# Patient Record
Sex: FEMALE | Race: WHITE | NOT HISPANIC OR LATINO | Employment: UNEMPLOYED | URBAN - METROPOLITAN AREA
[De-identification: names, ages, dates, MRNs, and addresses within clinical notes are randomized per-mention and may not be internally consistent; named-entity substitution may affect disease eponyms.]

---

## 2021-12-01 ENCOUNTER — OFFICE VISIT (OUTPATIENT)
Dept: FAMILY MEDICINE CLINIC | Facility: CLINIC | Age: 36
End: 2021-12-01

## 2021-12-01 VITALS
HEIGHT: 60 IN | WEIGHT: 126 LBS | BODY MASS INDEX: 24.74 KG/M2 | SYSTOLIC BLOOD PRESSURE: 122 MMHG | DIASTOLIC BLOOD PRESSURE: 76 MMHG

## 2021-12-01 DIAGNOSIS — R11.0 NAUSEA: ICD-10-CM

## 2021-12-01 DIAGNOSIS — Z13.220 LIPID SCREENING: ICD-10-CM

## 2021-12-01 DIAGNOSIS — Z00.00 ANNUAL PHYSICAL EXAM: ICD-10-CM

## 2021-12-01 DIAGNOSIS — M25.561 PAIN IN BOTH KNEES, UNSPECIFIED CHRONICITY: ICD-10-CM

## 2021-12-01 DIAGNOSIS — Z23 NEED FOR PNEUMOCOCCAL VACCINE: ICD-10-CM

## 2021-12-01 DIAGNOSIS — Z23 INFLUENZA VACCINE NEEDED: ICD-10-CM

## 2021-12-01 DIAGNOSIS — F41.9 ANXIETY: ICD-10-CM

## 2021-12-01 DIAGNOSIS — A53.9 SYPHILIS: Primary | ICD-10-CM

## 2021-12-01 DIAGNOSIS — J45.40 MODERATE PERSISTENT ASTHMA WITHOUT COMPLICATION: ICD-10-CM

## 2021-12-01 DIAGNOSIS — Z11.59 NEED FOR HEPATITIS C SCREENING TEST: ICD-10-CM

## 2021-12-01 DIAGNOSIS — M25.562 PAIN IN BOTH KNEES, UNSPECIFIED CHRONICITY: ICD-10-CM

## 2021-12-01 PROCEDURE — 99385 PREV VISIT NEW AGE 18-39: CPT

## 2021-12-01 PROCEDURE — 90686 IIV4 VACC NO PRSV 0.5 ML IM: CPT

## 2021-12-01 PROCEDURE — 99204 OFFICE O/P NEW MOD 45 MIN: CPT

## 2021-12-01 PROCEDURE — 90471 IMMUNIZATION ADMIN: CPT

## 2021-12-01 RX ORDER — BUSPIRONE HYDROCHLORIDE 5 MG/1
5 TABLET ORAL 2 TIMES DAILY
Qty: 90 TABLET | Refills: 0 | Status: SHIPPED | OUTPATIENT
Start: 2021-12-01 | End: 2022-02-04 | Stop reason: SDUPTHER

## 2021-12-01 RX ORDER — FLUOXETINE HYDROCHLORIDE 40 MG/1
CAPSULE ORAL
COMMUNITY
Start: 2021-11-11 | End: 2022-01-03 | Stop reason: SDUPTHER

## 2021-12-01 RX ORDER — ONDANSETRON 4 MG/1
4 TABLET, FILM COATED ORAL EVERY 8 HOURS PRN
Qty: 10 TABLET | Refills: 0 | Status: SHIPPED | OUTPATIENT
Start: 2021-12-01

## 2021-12-01 RX ORDER — TRAZODONE HYDROCHLORIDE 50 MG/1
TABLET ORAL
COMMUNITY
Start: 2021-11-18

## 2021-12-01 RX ORDER — ACAMPROSATE CALCIUM 333 MG/1
TABLET, DELAYED RELEASE ORAL
COMMUNITY
Start: 2021-11-18

## 2021-12-01 RX ORDER — HYDROXYZINE PAMOATE 25 MG/1
CAPSULE ORAL
COMMUNITY
Start: 2021-11-11 | End: 2021-12-01

## 2021-12-01 RX ORDER — BUPRENORPHINE HYDROCHLORIDE AND NALOXONE HYDROCHLORIDE DIHYDRATE 8; 2 MG/1; MG/1
TABLET SUBLINGUAL
COMMUNITY
Start: 2021-11-18

## 2021-12-01 RX ORDER — ALBUTEROL SULFATE 90 UG/1
2 AEROSOL, METERED RESPIRATORY (INHALATION) EVERY 4 HOURS PRN
Qty: 18 G | Refills: 3 | Status: SHIPPED | OUTPATIENT
Start: 2021-12-01

## 2021-12-01 RX ORDER — MULTIVITAMIN
TABLET ORAL
COMMUNITY
Start: 2021-11-11

## 2021-12-01 RX ORDER — IBUPROFEN 800 MG/1
TABLET ORAL
COMMUNITY
Start: 2021-11-11 | End: 2021-12-01 | Stop reason: SDUPTHER

## 2021-12-01 RX ORDER — IBUPROFEN 800 MG/1
800 TABLET ORAL EVERY 8 HOURS PRN
Qty: 30 TABLET | Refills: 0 | Status: SHIPPED | OUTPATIENT
Start: 2021-12-01 | End: 2022-02-04 | Stop reason: SDUPTHER

## 2021-12-01 NOTE — PROGRESS NOTES
Subjective   Soniya Vickers is a 36 y.o. female. Presents today as a New patient here to establish care    Chief Complaint   Patient presents with   • Establish Care       History of Present Illness     Previous PCP: Has not seen anyone for a long time.  Recently been going to [facility] [address] -   Medical Hx: states was told + syphilis about 2 months ago- was referred to ID but never made appointment.     Syphilis:  Dx approx 2 months ago  Unsure where she got it- states last sexual partners where at beginning of year- had 2 different partners  Developed Rash on hand and feet at end august   Has also felt really tired recently and c.o bettie knee pain and back pain.   States feels like getting a cold  Denies prior hx  Denies concern for other STDs  States not sexually active      Drug/alcohol abuse  Was incarcerated x 2 months r/t drugs   Went to rehab for drug and alcohol addition right after getting out of care home and completed in June  Used meth & alcohol  Sober since June 23rd.  Using suboxone- goes q 2 weeks to clinic  Has been experiencing some nausea when takes suboxone    ADHD  Would like tested   Has always thought she has but never tested as a child  Has trouble focusing on anything  Cannot keep appointments  Cannot keep house clean has stuff everywhere  Cannot sit down and finish a task    Asthma  Hx for years  Does not think has ever been tested  On albuterol and Symbicort and advair in the past but has been out for a long time  Has been wheezing a lot recently- wheezing today  Wakes up gasping for breath most of day of week  No SOA, CP      Smoking 1/3 pack ciggaretes/ day x 15 years- was given nicotine patch and gum but has not been using as not ready to quit  Had 1 covid vaccine when in care home- 2nd dose due  Has not had flu and pneumonia vaccine  Pap due  Labs due    Review of Systems   Constitutional: Positive for fatigue. Negative for chills, diaphoresis, fever and unexpected weight change.  "  HENT: Negative.    Eyes: Negative for visual disturbance.   Respiratory: Positive for cough and wheezing. Negative for apnea, choking, chest tightness, shortness of breath and stridor.    Cardiovascular: Negative.    Gastrointestinal: Negative.    Endocrine: Negative.    Genitourinary: Negative.    Musculoskeletal: Positive for arthralgias and back pain. Negative for joint swelling and myalgias.   Skin: Positive for rash.   Hematological: Negative.    Psychiatric/Behavioral: Positive for decreased concentration and sleep disturbance. Negative for self-injury and suicidal ideas. The patient is nervous/anxious.        There is no problem list on file for this patient.    Past Medical History:   Diagnosis Date   • Anxiety    • Depression      Past Surgical History:   Procedure Laterality Date   •  SECTION       History reviewed. No pertinent family history.  Social History     Socioeconomic History   • Marital status:    Tobacco Use   • Smoking status: Current Every Day Smoker     Packs/day: 0.25     Types: Cigarettes   • Smokeless tobacco: Never Used   Vaping Use   • Vaping Use: Every day   • Substances: Nicotine   Substance and Sexual Activity   • Alcohol use: Not Currently   • Drug use: Not Currently   • Sexual activity: Defer       No Known Allergies    Current Outpatient Medications on File Prior to Visit   Medication Sig Dispense Refill   • acamprosate (CAMPRAL) 333 MG EC tablet      • buprenorphine-naloxone (SUBOXONE) 8-2 MG per SL tablet      • FLUoxetine (PROzac) 40 MG capsule      • Multivitamin tablet tablet      • traZODone (DESYREL) 50 MG tablet      • [DISCONTINUED] hydrOXYzine pamoate (VISTARIL) 25 MG capsule      • [DISCONTINUED] ibuprofen (ADVIL,MOTRIN) 800 MG tablet        No current facility-administered medications on file prior to visit.       Objective   Vitals:    21 1451   BP: 122/76   Weight: 57.2 kg (126 lb)   Height: 152.4 cm (60\")     Body mass index is 24.61 " kg/m².  Physical Exam  HENT:      Head: Normocephalic.      Mouth/Throat:      Mouth: Mucous membranes are moist. No injury or oral lesions.      Dentition: Abnormal dentition. No dental tenderness.      Comments: edentulous  Eyes:      Conjunctiva/sclera: Conjunctivae normal.   Neck:      Thyroid: No thyroid mass, thyromegaly or thyroid tenderness.      Vascular: No carotid bruit.   Cardiovascular:      Rate and Rhythm: Normal rate and regular rhythm.      Pulses: Normal pulses.           Carotid pulses are 2+ on the right side and 2+ on the left side.     Heart sounds: No murmur heard.      Pulmonary:      Effort: Pulmonary effort is normal. No tachypnea or respiratory distress.      Breath sounds: No decreased air movement. Examination of the right-upper field reveals wheezing. Examination of the left-upper field reveals wheezing. Examination of the right-middle field reveals wheezing. Examination of the left-middle field reveals wheezing. Examination of the right-lower field reveals wheezing. Examination of the left-lower field reveals wheezing. Wheezing present.   Musculoskeletal:         General: No swelling, tenderness, deformity or signs of injury.      Cervical back: No tenderness.      Lumbar back: No swelling, tenderness or bony tenderness. Normal range of motion.      Right knee: No swelling or bony tenderness. Normal range of motion. No tenderness.      Left knee: No swelling or bony tenderness. Normal range of motion. No tenderness.      Right lower leg: No edema.      Left lower leg: No edema.   Lymphadenopathy:      Cervical: No cervical adenopathy.   Skin:     Findings: Rash present. Rash is macular.      Comments: Multiple, circular flat red-brownish rash on both palmar surfaces. No open lesions, no scabs, no edema, no drainage.    Neurological:      General: No focal deficit present.      Mental Status: She is alert and oriented to person, place, and time.   Psychiatric:         Attention and  Perception: She is inattentive.         Mood and Affect: Mood is anxious.         Speech: Speech is rapid and pressured.         Behavior: Behavior normal. Behavior is cooperative.         Thought Content: Thought content normal.         Cognition and Memory: Cognition normal.         Judgment: Judgment normal.       Assessment/Plan   Diagnoses and all orders for this visit:    1. Syphilis (Primary)  -     Ambulatory Referral to Infectious Disease    2. Anxiety  -     busPIRone (BUSPAR) 5 MG tablet; Take 1 tablet by mouth 2 (Two) Times a Day.  Dispense: 90 tablet; Refill: 0  -     TSH    3. Moderate persistent asthma without complication  -     albuterol sulfate  (90 Base) MCG/ACT inhaler; Inhale 2 puffs Every 4 (Four) Hours As Needed for Wheezing.  Dispense: 18 g; Refill: 3  -     fluticasone-salmeterol (Advair Diskus) 250-50 MCG/DOSE DISKUS; Inhale 1 puff 2 (Two) Times a Day.  Dispense: 60 each; Refill: 3    4. Pain in both knees, unspecified chronicity  -     ibuprofen (ADVIL,MOTRIN) 800 MG tablet; Take 1 tablet by mouth Every 8 (Eight) Hours As Needed for Mild Pain .  Dispense: 30 tablet; Refill: 0    5. Annual physical exam  -     Comprehensive Metabolic Panel  -     Lipid Panel  -     TSH    6. Lipid screening  -     Lipid Panel    7. Need for hepatitis C screening test  -     Hepatitis C Antibody    8. Influenza vaccine needed  -     FluLaval/Fluarix/Fluzone >6 Months (7260-2049)    9. Need for pneumococcal vaccine  -     Discontinue: pneumococcal polysaccharide 23-valent (PNEUMOVAX-23) vaccine 0.5 mL  -     pneumococcal conj. 13-valent (PREVNAR-13) vaccine 0.5 mL    10. Nausea  -     ondansetron (Zofran) 4 MG tablet; Take 1 tablet by mouth Every 8 (Eight) Hours As Needed for Nausea or Vomiting.  Dispense: 10 tablet; Refill: 0           - Syphilis: Lengthy discussion about syphilis stages, symptoms, tx and possible long term effects. Will sed referral to ID- strongly advised to keep appointment- call  if does not get a call to make appointment in 1-2 weeks.   - ADHD: dicussed required testing. Handout given with numbers to call & make appointment with psych.   - Anxiety: states hydroxyzine not helping and just makes her sleepy. Prozac helps some. Will switch hydroxyzine to low dose Buspar to see if more effective. Use and side effects discussed. Use with caution while on suboxone as may cause worsening SE. Call if noticed any SE.  - Alcohol/durg abuse: continue with current tx.   - Asthma: Will send albuterol and Advair. Use and SE discussed. Will F/U in two weeks to see if improvement.   - knee pain/back pain: exam unremarkable. Possibly due to syphilis. Will give some ibuprofen to take as needed. Take with food. SE discussed.   - Advised smoking cessation but pt states not ready- has nicotine gum and patches available.   - Pneumonia and flu vaccine updated. Will get labs  - Work on a healthy diet.  Limit salt, sugar and fatty foods.  Add exercise to your routine.  Recommendation is 150 minutes of moderate exercise per week if you cannot do that, at least walk 30 minutes 3-4 times per week.   - will give short supply zofran for nausea while on suboxone. Advised to take with food. Address side effect next time goes to clinic.     -Follow up: 2 weeks to check on asthma. Consider referral for lung function test/imaging if no improvement in lung sounds/symptoms.     This document is intended for medical expert use only. Reading of this document by patients and/or patient's family without participating medical staff guidance may result in misinterpretation and unintended morbidity.  Any interpretation of such data is the responsibility of the patient and/or family member responsible for the patient in concert with their primary or specialist providers, not to be left for sources of online searches such as [employer], [employer] or similar queries. Relying on these approaches to knowledge may result in misinterpretation,  misguided goals of care and even death should patients or family members try recommendations outside of the realm of professional medical care in a supervised way.     Please allow 3-5 business days for recommendations based on new results     Go to the ER for any possible lifethreatening symptoms such as chest pain or shortness of air.      I personally spent 45 minutes with this patient, preparing for the visit, reviewing tests, obtaining and/or reviewing a separately obtained history, performing a medically appropriate examination and/or evaluation , counseling and educating the patient/family/caregiver, ordering medications, tests, or procedures, documenting information in the medical record and independently interpreting results.

## 2021-12-02 LAB
ALBUMIN SERPL-MCNC: 4 G/DL (ref 3.8–4.8)
ALBUMIN/GLOB SERPL: 1.1 {RATIO} (ref 1.2–2.2)
ALP SERPL-CCNC: 122 IU/L (ref 44–121)
ALT SERPL-CCNC: 13 IU/L (ref 0–32)
AST SERPL-CCNC: 27 IU/L (ref 0–40)
BILIRUB SERPL-MCNC: 0.2 MG/DL (ref 0–1.2)
BUN SERPL-MCNC: 10 MG/DL (ref 6–20)
BUN/CREAT SERPL: 10 (ref 9–23)
CALCIUM SERPL-MCNC: 9.2 MG/DL (ref 8.7–10.2)
CHLORIDE SERPL-SCNC: 101 MMOL/L (ref 96–106)
CHOLEST SERPL-MCNC: 186 MG/DL (ref 100–199)
CO2 SERPL-SCNC: 24 MMOL/L (ref 20–29)
CREAT SERPL-MCNC: 1.05 MG/DL (ref 0.57–1)
GLOBULIN SER CALC-MCNC: 3.6 G/DL (ref 1.5–4.5)
GLUCOSE SERPL-MCNC: 92 MG/DL (ref 65–99)
HCV AB S/CO SERPL IA: <0.1 S/CO RATIO (ref 0–0.9)
HDLC SERPL-MCNC: 30 MG/DL
LDLC SERPL CALC-MCNC: 141 MG/DL (ref 0–99)
POTASSIUM SERPL-SCNC: 4.3 MMOL/L (ref 3.5–5.2)
PROT SERPL-MCNC: 7.6 G/DL (ref 6–8.5)
SODIUM SERPL-SCNC: 138 MMOL/L (ref 134–144)
TRIGL SERPL-MCNC: 81 MG/DL (ref 0–149)
TSH SERPL DL<=0.005 MIU/L-ACNC: 2.49 UIU/ML (ref 0.45–4.5)
VLDLC SERPL CALC-MCNC: 15 MG/DL (ref 5–40)

## 2021-12-02 NOTE — PROGRESS NOTES
Please inform patient that cholesterol and creatinine (kidney function) mildly elevated. Work on a healthy diet.  Limit salt, sugar and fatty foods.  Add exercise to your routine and drink at least 8 cups of water daily.

## 2022-01-03 ENCOUNTER — OFFICE VISIT (OUTPATIENT)
Dept: FAMILY MEDICINE CLINIC | Facility: CLINIC | Age: 37
End: 2022-01-03

## 2022-01-03 VITALS
HEART RATE: 79 BPM | DIASTOLIC BLOOD PRESSURE: 62 MMHG | WEIGHT: 136 LBS | OXYGEN SATURATION: 98 % | HEIGHT: 60 IN | BODY MASS INDEX: 26.7 KG/M2 | SYSTOLIC BLOOD PRESSURE: 114 MMHG

## 2022-01-03 DIAGNOSIS — F41.9 ANXIETY: ICD-10-CM

## 2022-01-03 DIAGNOSIS — J45.40 MODERATE PERSISTENT ASTHMA WITHOUT COMPLICATION: Primary | ICD-10-CM

## 2022-01-03 DIAGNOSIS — A53.9 SYPHILIS: ICD-10-CM

## 2022-01-03 PROCEDURE — 99214 OFFICE O/P EST MOD 30 MIN: CPT

## 2022-01-03 RX ORDER — FLUOXETINE HYDROCHLORIDE 20 MG/1
20 CAPSULE ORAL DAILY
Qty: 30 CAPSULE | Refills: 0 | Status: SHIPPED | OUTPATIENT
Start: 2022-01-03 | End: 2022-02-04 | Stop reason: SDUPTHER

## 2022-01-03 RX ORDER — NICOTINE 21 MG/24HR
PATCH, TRANSDERMAL 24 HOURS TRANSDERMAL
COMMUNITY
Start: 2021-12-30

## 2022-01-03 RX ORDER — DIPHENOXYLATE HYDROCHLORIDE AND ATROPINE SULFATE 2.5; .025 MG/1; MG/1
TABLET ORAL
COMMUNITY
Start: 2021-12-16

## 2022-01-03 RX ORDER — HYDROXYZINE PAMOATE 25 MG/1
25 CAPSULE ORAL 3 TIMES DAILY PRN
Qty: 60 CAPSULE | Refills: 0 | Status: SHIPPED | OUTPATIENT
Start: 2022-01-03

## 2022-01-03 RX ORDER — HYDROXYZINE PAMOATE 25 MG/1
CAPSULE ORAL
COMMUNITY
Start: 2021-12-16 | End: 2022-01-03 | Stop reason: SDUPTHER

## 2022-01-03 RX ORDER — FLUOXETINE HYDROCHLORIDE 40 MG/1
40 CAPSULE ORAL DAILY
Qty: 30 CAPSULE | Refills: 0 | Status: SHIPPED | OUTPATIENT
Start: 2022-01-03 | End: 2022-02-04 | Stop reason: SDUPTHER

## 2022-01-03 NOTE — PROGRESS NOTES
"Subjective   Soniya Vickers is a 36 y.o. female. Who presents to follow up on asthma    History of Present Illness     Asthma  Has been using inhalers with relief  States quit smoking cigarettes and now just vaping & nicotine patch with much improvement in symptoms  No more cough, SOA or wheezing.    Syphilis  Has not seen ID- was called (we were called as well) by health dept to inform pt has not received tx  Rash still present on palms. Also states gral body aches and joint pain.         Anxiety  Not doing well as Son had court and was locked up today   Has been taking buspar BID, hydroxizine as needed & Prozac 40  States Buspar helping a little but still feels very anxious all the time   Would like to see psych and see if \"stronger\" treatment  No thoughts of hurting self or others    The following portions of the patient's history were reviewed and updated as appropriate: allergies, current medications, past family history, past medical history, past social history and past surgical history.    Review of Systems   Constitutional: Positive for fatigue. Negative for chills and fever.   Respiratory: Negative.    Cardiovascular: Negative.    Musculoskeletal: Positive for arthralgias and myalgias.   Neurological: Negative for dizziness and headache.   Psychiatric/Behavioral: Positive for decreased concentration, positive for hyperactivity, depressed mood and stress. Negative for self-injury, sleep disturbance and suicidal ideas. The patient is nervous/anxious.        Objective    /62   Pulse 79   Ht 152.4 cm (60\")   Wt 61.7 kg (136 lb)   SpO2 98%   BMI 26.56 kg/m²     Physical Exam  Constitutional:       Appearance: Normal appearance. She is not ill-appearing.   Neck:      Thyroid: No thyroid mass, thyromegaly or thyroid tenderness.   Cardiovascular:      Rate and Rhythm: Normal rate and regular rhythm.      Pulses: Normal pulses.      Heart sounds: Normal heart sounds. No murmur heard.      Pulmonary:     "  Effort: Pulmonary effort is normal. No respiratory distress.      Breath sounds: Normal breath sounds. No wheezing or rhonchi.   Skin:     Findings: Rash present.          Neurological:      General: No focal deficit present.      Mental Status: She is alert and oriented to person, place, and time.   Psychiatric:         Mood and Affect: Mood normal.         Behavior: Behavior normal.         Thought Content: Thought content normal.         Judgment: Judgment normal.       Assessment/Plan   Diagnoses and all orders for this visit:    1. Moderate persistent asthma without complication (Primary)       -   Symptoms improving. Cont current tx. Cont smoking cessation and try to limit or quit vaping as also harmful.     2. Anxiety  -     Ambulatory Referral to Psychiatry  -     hydrOXYzine pamoate (VISTARIL) 25 MG capsule; Take 1 capsule by mouth 3 (Three) Times a Day As Needed for Anxiety.  Dispense: 60 capsule; Refill: 0  -     FLUoxetine (PROzac) 20 MG capsule; Take 1 capsule by mouth Daily.  Dispense: 30 capsule; Refill: 0  -     FLUoxetine (PROzac) 40 MG capsule; Take 1 capsule by mouth Daily.  Dispense: 30 capsule; Refill: 0  -    Still having a lot of anxiety. Going through a lot with son and still on suboxone.   -    Will increase Prozac to see if helps. Cont BID Buspar and hydroxyzine as needed only. Advised increased risk of interaction and side effects with use of these medications with suboxone- states understanding  -    Will send to psych for further advise.   -    If thoughts of hurting self or others notify someone immediately or go to ED.     3. Syphilis        - Advised symptoms could be related and needs to see ID        -  Gave pt ID number to call and make appointment ASAP       -  Safety practices such as abstinence or safe sex practices discussed      Follow up in 1 month to check on Anxiety- sooner as needed    This document is intended for medical expert use only. Reading of this document by  patients and/or patient's family without participating medical staff guidance may result in misinterpretation and unintended morbidity.  Any interpretation of such data is the responsibility of the patient and/or family member responsible for the patient in concert with their primary or specialist providers, not to be left for sources of online searches such as [employer], [employer] or similar queries. Relying on these approaches to knowledge may result in misinterpretation, misguided goals of care and even death should patients or family members try recommendations outside of the realm of professional medical care in a supervised way.     Please allow 3-5 business days for recommendations based on new results     Go to the ER for any possible lifethreatening symptoms such as chest pain or shortness of air.      I personally spent 30 minutes with this patient, preparing for the visit, reviewing tests, obtaining and/or reviewing a separately obtained history, performing a medically appropriate examination and/or evaluation , counseling and educating the patient/family/caregiver, ordering medications, tests, or procedures, documenting information in the medical record and independently interpreting results.

## 2022-02-04 ENCOUNTER — TELEMEDICINE (OUTPATIENT)
Dept: FAMILY MEDICINE CLINIC | Facility: CLINIC | Age: 37
End: 2022-02-04

## 2022-02-04 DIAGNOSIS — M25.561 PAIN IN BOTH KNEES, UNSPECIFIED CHRONICITY: ICD-10-CM

## 2022-02-04 DIAGNOSIS — A53.9 SYPHILIS: ICD-10-CM

## 2022-02-04 DIAGNOSIS — M25.562 PAIN IN BOTH KNEES, UNSPECIFIED CHRONICITY: ICD-10-CM

## 2022-02-04 DIAGNOSIS — F41.9 ANXIETY: Primary | ICD-10-CM

## 2022-02-04 PROCEDURE — 99441 PR PHYS/QHP TELEPHONE EVALUATION 5-10 MIN: CPT

## 2022-02-04 RX ORDER — FLUOXETINE HYDROCHLORIDE 20 MG/1
20 CAPSULE ORAL DAILY
Qty: 30 CAPSULE | Refills: 0 | Status: SHIPPED | OUTPATIENT
Start: 2022-02-04

## 2022-02-04 RX ORDER — FLUOXETINE HYDROCHLORIDE 40 MG/1
40 CAPSULE ORAL DAILY
Qty: 30 CAPSULE | Refills: 0 | Status: SHIPPED | OUTPATIENT
Start: 2022-02-04

## 2022-02-04 RX ORDER — IBUPROFEN 800 MG/1
800 TABLET ORAL EVERY 8 HOURS PRN
Qty: 20 TABLET | Refills: 0 | Status: SHIPPED | OUTPATIENT
Start: 2022-02-04

## 2022-02-04 RX ORDER — BUSPIRONE HYDROCHLORIDE 5 MG/1
5 TABLET ORAL 2 TIMES DAILY
Qty: 90 TABLET | Refills: 0 | Status: SHIPPED | OUTPATIENT
Start: 2022-02-04

## 2022-02-04 NOTE — PROGRESS NOTES
Subjective   Soniya Vickers is a 36 y.o. female. Who presents through a telephone visit to f/u on anxiety    History of Present Illness     Anxiety  States doing better on increased dose of Prozac (60 mg QD). Taking Buspar BID with no SE.  Has not seen psych yet. Phone number given to make appointment.  Denies thoughts of hurting self or others    Gral pain- MARSHALL Knee pain  Takes Ibuprofen PRN- states only takes occasionally- needs refill  Last Cr 1.05    Syphilis  Has not seen ID  States called and was told MD only sees pts at hospital. Needs a new referral.   Continues to have a rash on MARSHALL palms of hands and gral mild joint pain. No new symptoms     The following portions of the patient's history were reviewed and updated as appropriate: allergies, current medications, past family history, past medical history, past social history, past surgical history and problem list.    Review of Systems   Constitutional: Negative for appetite change, chills, fatigue and fever.   Respiratory: Negative for shortness of breath.    Cardiovascular: Negative.    Musculoskeletal: Positive for arthralgias.   Neurological: Negative for dizziness, weakness and headache.   Psychiatric/Behavioral: Positive for stress. Negative for self-injury, sleep disturbance and suicidal ideas. The patient is nervous/anxious (improved on tx).        Objective   Physical Exam  Neurological:      General: No focal deficit present.      Mental Status: She is alert and oriented to person, place, and time.   Psychiatric:         Mood and Affect: Mood normal.         Speech: Speech normal.         Behavior: Behavior normal.         Thought Content: Thought content normal.         Judgment: Judgment normal.      Comments: Physical exam limited as this was a telephone visit.    No vital signs available           Assessment/Plan   Diagnoses and all orders for this visit:    1. Anxiety (Primary)  -     busPIRone (BUSPAR) 5 MG tablet; Take 1 tablet by mouth 2  (Two) Times a Day.  Dispense: 90 tablet; Refill: 0  -     FLUoxetine (PROzac) 20 MG capsule; Take 1 capsule by mouth Daily.  Dispense: 30 capsule; Refill: 0  -     FLUoxetine (PROzac) 40 MG capsule; Take 1 capsule by mouth Daily.  Dispense: 30 capsule; Refill: 0  -     Improved on current tx. Cont for now. Pt still would like to f/u with psych. Phone number given to make appointment.   -     If thoughts of hurting self or others go to ED or call someone immediately.     2. Syphilis  -     Ambulatory Referral to Infectious Disease  -     Will try to get records of labs again. I will repeat labs as well and if positive will go ahead and start antibiotics as may take a while for pt to get in with ID.   -     Avoid sexual contact.     3. Pain in both knees, unspecified chronicity  -     ibuprofen (ADVIL,MOTRIN) 800 MG tablet; Take 1 tablet by mouth Every 8 (Eight) Hours As Needed for Mild Pain .  Dispense: 20 tablet; Refill: 0  -    Discussed last cr mildly elevated. Discussed use and SE of NSAIDs. Advised extreme caution with use of NSAIDs, only use if unable to ctrol pain with other conservative tx such as ICE/Heat, rest or acetaminophen. I think some of her joint pain may be r/t syphilis, hopefully gets in with ID soon. Will send only a few tabs of Ibuprofen to use PRN.       Follow up in 3 months to repeat CMP. Sooner as needed.     This document is intended for medical expert use only. Reading of this document by patients and/or patient's family without participating medical staff guidance may result in misinterpretation and unintended morbidity.  Any interpretation of such data is the responsibility of the patient and/or family member responsible for the patient in concert with their primary or specialist providers, not to be left for sources of online searches such as [employer], [employer] or similar queries. Relying on these approaches to knowledge may result in misinterpretation, misguided goals of care and even death  should patients or family members try recommendations outside of the realm of professional medical care in a supervised way.     Please allow 3-5 business days for recommendations based on new results     Go to the ER for any possible lifethreatening symptoms such as chest pain or shortness of air.      I personally spent 10 minutes with this patient, preparing for the visit, reviewing tests, obtaining and/or reviewing a separately obtained history, performing a medically appropriate examination and/or evaluation , counseling and educating the patient/family/caregiver, ordering medications, tests, or procedures, documenting information in the medical record and independently interpreting results.     You have chosen to receive care through a telephone visit. Do you consent to use a telephone visit for your medical care today? Yes  This telephone visit was conducted through [employer] to comply with HIPAA regulations. Pt agrees to audio visit. unable to get video.

## 2022-02-09 DIAGNOSIS — A53.9 SYPHILIS: Primary | ICD-10-CM
